# Patient Record
Sex: MALE | Race: WHITE | Employment: FULL TIME | URBAN - METROPOLITAN AREA
[De-identification: names, ages, dates, MRNs, and addresses within clinical notes are randomized per-mention and may not be internally consistent; named-entity substitution may affect disease eponyms.]

---

## 2018-01-26 ENCOUNTER — OFFICE VISIT (OUTPATIENT)
Dept: ORTHOPEDIC SURGERY | Age: 63
End: 2018-01-26

## 2018-01-26 NOTE — PROGRESS NOTES
[facility]  [address], [address]  Phone: [phone]  Fax: [phone]        INITIAL CONSULTATION      HISTORY OF PRESENT ILLNESS:  Kim Barfield is a 58 y.o. male whom is referred from *** secondary to ***. He rates pain ***/10. The patient is ***.  reviewed. There is no height or weight on file to calculate BMI. No past medical history on file. No past surgical history on file. Social History   Substance Use Topics    Smoking status: Not on file    Smokeless tobacco: Not on file    Alcohol use Not on file     Work status: unknown  Marital status: unknown        Allergies not on file       No family history on file. REVIEW OF SYSTEMS  Constitutional symptoms: Negative  Eyes: Negative  Ears, Nose, Throat, and Mouth: Negative  Cardiovascular: Negative  Respiratory: Negative  Genitourinary: Negative  Integumentary (Skin and/or breast): Negative  Musculoskeletal: Positive for ***  Extremities: Negative for edema. Endocrine/Rheumatologic: Negative  Hematologic/Lymphatic: Negative  Allergic/Immunologic: Negative  Psychiatric: Negative       PHYSICAL EXAMINATION  There were no vitals taken for this visit. CONSTITUTIONAL: NAD, A&O x 3  HEART: Regular rate and rhythm  ABDOMEN: Positive bowel sounds, soft, nontender, and nondistended  LUNGS: Clear to auscultation bilaterally. SKIN: No rashes noted. RANGE OF MOTION: The patient has full passive range of motion in all four extremities. SENSATION: Sensation is intact to light touch throughout. MOTOR:   Straight Leg Raise: {Pos/neg/not test:765751::\"Negative\"}, {right/left:84764}  Veloz: {Pos/neg/not test:887420::\"Negative\"}, {right/left:91518}  Tandem Gait: {Blank Single Select Template:20061::\"Neg. \",\"Mild LOB\",\"Severe LOB\"}   Deep tendon reflexes are 2+ at the brachioradialis, biceps, and triceps. Deep tendon reflexes are 2+ at the knees and ankles bilaterally.     *** Shoulder AB/Flex Elbow Flex Wrist Ext Elbow Ext Wrist Flex Hand Intrin Tone   Right +4/5 +4/5 +4/5 +4/5 +4/5 +4/5 +4/5   Left +4/5 +4/5 +4/5 +4/5 +4/5 +4/5 +4/5             *** Hip Flex Knee Ext Knee Flex Ankle DF GTE Ankle PF Tone   Right +4/5 +4/5 +4/5 +4/5 +4/5 +4/5 +4/5   Left +4/5 +4/5 +4/5 +4/5 +4/5 +4/5 +4/5         ASSESSMENT   {There are no diagnoses linked to this encounter. (Refresh or delete this SmartLink)}       IMPRESSIONS/RECOMMENDATIONS:  *** I will see the patient back in *** month's time or earlier if needed. Written by Sadi Bhat, as dictated by Vish Portillo MD  I examined the patient, reviewed and agree with the note.

## 2018-09-28 ENCOUNTER — IMPORTED ENCOUNTER (OUTPATIENT)
Dept: URBAN - METROPOLITAN AREA CLINIC 1 | Facility: CLINIC | Age: 63
End: 2018-09-28

## 2018-09-28 PROBLEM — H04.123: Noted: 2018-09-28

## 2018-09-28 PROBLEM — H25.813: Noted: 2018-09-28

## 2018-09-28 PROBLEM — H16.143: Noted: 2018-09-28

## 2018-09-28 PROBLEM — H43.391: Noted: 2018-09-28

## 2018-09-28 PROCEDURE — 92014 COMPRE OPH EXAM EST PT 1/>: CPT

## 2018-09-28 NOTE — PATIENT DISCUSSION
1.  Vitreous Floaters OD -- No Pigmented Cells seen or found on exam today. No Retinal Tear / Detachment seen or found on exam today. RD Precautions given. 2.  Cataracts OU -- Observe for now without intervention. The patient was advised to contact us if any change or worsening of vision. 3. SUSHIL w/ PEK OU -- Recommend the frequent use of OTC AT's BID-QID OU (sample given). Return for an appointment in 1 YR for a 30 OU with Dr. Sharee Casarez. Patient defers the refraction at today's visit (will return under [address] / Insurance).

## 2018-11-02 ENCOUNTER — HOSPITAL ENCOUNTER (OUTPATIENT)
Dept: GENERAL RADIOLOGY | Age: 63
Discharge: HOME | End: 2018-11-02
Attending: RADIOLOGY | Admitting: RADIOLOGY
Payer: COMMERCIAL

## 2018-11-02 ENCOUNTER — APPOINTMENT (OUTPATIENT)
Dept: CT IMAGING | Age: 63
End: 2018-11-02
Attending: NEUROLOGICAL SURGERY
Payer: COMMERCIAL

## 2018-11-02 VITALS
RESPIRATION RATE: 16 BRPM | DIASTOLIC BLOOD PRESSURE: 86 MMHG | BODY MASS INDEX: 24.42 KG/M2 | SYSTOLIC BLOOD PRESSURE: 126 MMHG | WEIGHT: 174.44 LBS | HEART RATE: 62 BPM | HEIGHT: 71 IN | OXYGEN SATURATION: 98 % | TEMPERATURE: 96.7 F

## 2018-11-02 DIAGNOSIS — M48.061 LUMBAR STENOSIS: ICD-10-CM

## 2018-11-02 DIAGNOSIS — M54.50 LOW BACK PAIN: ICD-10-CM

## 2018-11-02 DIAGNOSIS — M54.16 LUMBAR RADICULOPATHY: ICD-10-CM

## 2018-11-02 PROCEDURE — 74011250636 HC RX REV CODE- 250/636: Performed by: RADIOLOGY

## 2018-11-02 PROCEDURE — 74011250637 HC RX REV CODE- 250/637: Performed by: RADIOLOGY

## 2018-11-02 PROCEDURE — 74011636320 HC RX REV CODE- 636/320: Performed by: RADIOLOGY

## 2018-11-02 PROCEDURE — 72132 CT LUMBAR SPINE W/DYE: CPT

## 2018-11-02 PROCEDURE — 62304 MYELOGRAPHY LUMBAR INJECTION: CPT

## 2018-11-02 RX ORDER — LIDOCAINE HYDROCHLORIDE 10 MG/ML
10 INJECTION, SOLUTION EPIDURAL; INFILTRATION; INTRACAUDAL; PERINEURAL
Status: COMPLETED | OUTPATIENT
Start: 2018-11-02 | End: 2018-11-02

## 2018-11-02 RX ORDER — HYDROCODONE BITARTRATE AND ACETAMINOPHEN 7.5; 325 MG/1; MG/1
1 TABLET ORAL
Status: DISCONTINUED | OUTPATIENT
Start: 2018-11-02 | End: 2018-11-02 | Stop reason: HOSPADM

## 2018-11-02 RX ADMIN — HYDROCODONE BITARTRATE AND ACETAMINOPHEN 1 TABLET: 7.5; 325 TABLET ORAL at 12:45

## 2018-11-02 RX ADMIN — LIDOCAINE HYDROCHLORIDE 5 ML: 10 INJECTION, SOLUTION EPIDURAL; INFILTRATION; INTRACAUDAL; PERINEURAL at 10:45

## 2018-11-02 RX ADMIN — IOPAMIDOL 20 ML: 408 INJECTION, SOLUTION INTRATHECAL at 10:48

## 2018-11-02 NOTE — DISCHARGE INSTRUCTIONS
Myelogram: About This Test  What is it? A myelogram uses X-rays and a special dye to make pictures of bones and nerves of the spine (spinal canal). The spinal canal holds the spinal cord, the spinal nerve roots, and the fluid-filled space between the bones in your spine. Why is this test done? A myelogram is done to check for:  · The cause of arm or leg numbness, weakness, or pain. · Narrowing of the spinal canal (spinal stenosis). · A tumor or infection causing problems with the spinal cord or nerve roots. · A spinal disc that has ruptured (herniated disc). · Inflammation of the membrane that covers the brain and spinal cord. · Problems with the blood vessels to the spine. How can you prepare for the test?  Your doctor will tell you if you need to change how much you eat and drink before the myelogram. You may be asked to increase the amount of water you drink before the test. Follow the instructions your doctor gives you about eating and drinking. Before a myelogram, tell your doctor if:  · You are allergic to any medicines, contrast material, or iodine dye. · You have had bleeding problems, or you take a blood thinner, such as aspirin, clopidogrel (Plavix), or warfarin (Coumadin), or you take over-the-counter medicines, such as ibuprofen (Advil, Motrin) or naproxen (Aleve). Your doctor will tell you when you should stop taking these medicines several days before your procedure. Make sure that you understand exactly what he or she wants you to do. · You have had kidney problems. · You have diabetes, especially if you take metformin (Glucophage, for example). · You are or might be pregnant. Ask someone to take you home and stay with you after the test.  What happens during the test?  The dye is put into your spinal canal with a thin needle. This is called a lumbar puncture. The dye moves through the space so the nerve roots and spinal cord can be seen more clearly.  After the dye is put in, you will lie still while the X-ray pictures are taken. How does it feel? You will feel a quick sting from a small needle that has medicine to numb the skin on your back. You will also feel some pressure as the long, thin spinal needle is put into your spinal canal. You may feel a quick sharp pain down your buttock or leg when the needle is moved in your spine. The dye may make you feel warm and flushed and have a metallic taste in your mouth. What else should you know about the test?  In rare cases, the hole made by the needle in the sac around the spine does not close normally. This can allow spinal fluid to leak out. This leak may need to be repaired through a procedure called an epidural blood patch. To do the patch, your doctor injects some of your own blood to cover the hole. How long does the test take? · A myelogram usually takes 30 minutes to 1 hour. What happens after the test?  · You will probably be able to go home 30 minutes to 2 hours after the test.  · You may need to lie in bed with your head raised for 4 to 24 hours after the test. To prevent seizures, which are a rare side effect, do not bend over or lie down with your head lower than your body. Keeping your head higher than your body after a myelogram also may help prevent or reduce other side effects, such as headache, nausea, or vomiting. · Avoid strenuous activity, such as running or heavy lifting, for at least 1 day after your myelogram.  · Drink plenty of water after the myelogram. Your doctor will give you instructions on taking your regular medicines. When should you call for help? Call 911 anytime you think you may need emergency care. For example, call if:  · You have a seizure. Call your doctor now or seek immediate medical care if:  · You have any increase in pain, weakness, or numbness in your legs. · You have a severe headache or stiff neck, or your eyes become very sensitive to light.   · You have a headache that lasts longer than 24 hours. · You have problems urinating or having a bowel movement. · You have a fever. Follow-up care is a key part of your treatment and safety. Be sure to make and go to all appointments, and call your doctor if you are having problems. It's also a good idea to keep a list of the medicines you take. Ask your doctor when you can expect to have your test results. Where can you learn more? Go to http://flaca-kimmie.info/. Enter A140 in the search box to learn more about \"Myelogram: About This Test.\"  Current as of: June 26, 2018  Content Version: 11.8  © 1433-5092 [employer]. Care instructions adapted under license by [employer] (which disclaims liability or warranty for this information). If you have questions about a medical condition or this instruction, always ask your healthcare professional. [place] [address] any warranty or liability for your use of this information. DISCHARGE SUMMARY from Nurse    PATIENT INSTRUCTIONS:    After general anesthesia or intravenous sedation, for 24 hours or while taking prescription Narcotics:  · Limit your activities  · Do not drive and operate hazardous machinery  · Do not make important personal or business decisions  · Do  not drink alcoholic beverages  · If you have not urinated within 8 hours after discharge, please contact your surgeon on call.     Report the following to your surgeon:  · Excessive pain, swelling, redness or odor of or around the surgical area  · Temperature over 100.5  · Nausea and vomiting lasting longer than 4 hours or if unable to take medications  · Any signs of decreased circulation or nerve impairment to extremity: change in color, persistent  numbness, tingling, coldness or increase pain  · Any questions    What to do at Home:      These are general instructions for a healthy lifestyle:    No smoking/ No tobacco products/ Avoid exposure to second hand smoke  Surgeon General's Warning:  Quitting smoking now greatly reduces serious risk to your health. Obesity, smoking, and sedentary lifestyle greatly increases your risk for illness    A healthy diet, regular physical exercise & weight monitoring are important for maintaining a healthy lifestyle    You may be retaining fluid if you have a history of heart failure or if you experience any of the following symptoms:  Weight gain of 3 pounds or more overnight or 5 pounds in a week, increased swelling in our hands or feet or shortness of breath while lying flat in bed. Please call your doctor as soon as you notice any of these symptoms; do not wait until your next office visit. Recognize signs and symptoms of STROKE:    F-face looks uneven    A-arms unable to move or move unevenly    S-speech slurred or non-existent    T-time-call 911 as soon as signs and symptoms begin-DO NOT go       Back to bed or wait to see if you get better-TIME IS BRAIN. Warning Signs of HEART ATTACK     Call 911 if you have these symptoms:   Chest discomfort. Most heart attacks involve discomfort in the center of the chest that lasts more than a few minutes, or that goes away and comes back. It can feel like uncomfortable pressure, squeezing, fullness, or pain.  Discomfort in other areas of the upper body. Symptoms can include pain or discomfort in one or both arms, the back, neck, jaw, or stomach.  Shortness of breath with or without chest discomfort.  Other signs may include breaking out in a cold sweat, nausea, or lightheadedness. Don't wait more than five minutes to call 911 - MINUTES MATTER! Fast action can save your life. Calling 911 is almost always the fastest way to get lifesaving treatment. Emergency Medical Services staff can begin treatment when they arrive -- up to an hour sooner than if someone gets to the hospital by car. The discharge information has been reviewed with the patient.   The patient verbalized understanding. Discharge medications reviewed with the patient and appropriate educational materials and side effects teaching were provided. ___________________________________________________________________________________________________________________________________    Patient armband removed and given to patient to take home.   Patient was informed of the privacy risks if armband lost or stolen

## 2018-11-02 NOTE — PERIOP NOTES
Pt arrived. Orders released. VS Monitoring initiated. Oriented to POC and Room. Wife by bedside. VR/CT disc provided to wife. Pt is resting with his eyes closed.

## 2018-12-14 PROBLEM — Z98.890 S/P LUMBAR LAMINECTOMY: Status: ACTIVE | Noted: 2018-12-14

## 2018-12-14 PROBLEM — M48.061 LUMBAR STENOSIS: Status: ACTIVE | Noted: 2018-12-14

## 2018-12-16 PROBLEM — M48.061 LUMBAR STENOSIS: Status: RESOLVED | Noted: 2018-12-14 | Resolved: 2018-12-16

## 2019-11-15 ENCOUNTER — IMPORTED ENCOUNTER (OUTPATIENT)
Dept: URBAN - METROPOLITAN AREA CLINIC 1 | Facility: CLINIC | Age: 64
End: 2019-11-15

## 2019-11-15 PROBLEM — H16.143: Noted: 2019-11-15

## 2019-11-15 PROBLEM — H43.391: Noted: 2019-11-15

## 2019-11-15 PROBLEM — H25.813: Noted: 2019-11-15

## 2019-11-15 PROBLEM — H04.123: Noted: 2019-11-15

## 2019-11-15 PROCEDURE — 92014 COMPRE OPH EXAM EST PT 1/>: CPT

## 2019-11-15 PROCEDURE — 92015 DETERMINE REFRACTIVE STATE: CPT

## 2019-11-15 NOTE — PATIENT DISCUSSION
1.  Cataracts OU -- Observe for now without intervention. The patient was advised to contact us if any change or worsening of vision. 2. SUSHIL w/ PEK OU -- Recommend the frequent use of OTC AT's BID-QID OU Routinely. 3.  Vitreous Floaters OD -- RD Precautions given. Return for an appointment in 1 YR for a 30 OU with Dr. Emiliano Walsh.

## 2020-11-13 ENCOUNTER — IMPORTED ENCOUNTER (OUTPATIENT)
Dept: URBAN - METROPOLITAN AREA CLINIC 1 | Facility: CLINIC | Age: 65
End: 2020-11-13

## 2020-11-13 PROBLEM — H43.391: Noted: 2020-11-13

## 2020-11-13 PROBLEM — H04.123: Noted: 2020-11-13

## 2020-11-13 PROBLEM — H16.143: Noted: 2020-11-13

## 2020-11-13 PROBLEM — H25.813: Noted: 2020-11-13

## 2020-11-13 PROCEDURE — 92014 COMPRE OPH EXAM EST PT 1/>: CPT

## 2020-11-13 NOTE — PATIENT DISCUSSION
Vitreous Floaters OD -- Stable. Patient deferred MRx today. Return for an appointment in 1 year 27 with Dr. Silvia Reyes.

## 2020-11-13 NOTE — PATIENT DISCUSSION
1.  Cataract OU -- Observe for now without intervention. The patient was advised to contact us if any change or worsening of vision2. SUSHIL w/ PEK OU -- Cont the recommended use of ATs BID OU routinely. 3. Vitreous Floaters OD -- Stable. Patient deferred MRx today. Return for an appointment in 1 year 27 with Dr. Kristy Sierra.

## 2021-01-08 PROBLEM — R55 SYNCOPE: Status: ACTIVE | Noted: 2021-01-08

## 2021-01-08 PROBLEM — I44.2 COMPLETE HEART BLOCK (HCC): Status: ACTIVE | Noted: 2021-01-08

## 2021-01-08 PROBLEM — R55 SYNCOPE AND COLLAPSE: Status: ACTIVE | Noted: 2021-01-08

## 2021-01-08 PROBLEM — I44.2 INTERMITTENT COMPLETE HEART BLOCK (HCC): Status: ACTIVE | Noted: 2021-01-08

## 2021-01-09 PROBLEM — Z95.0 CARDIAC PACEMAKER IN SITU: Status: ACTIVE | Noted: 2021-01-09

## 2021-08-03 PROBLEM — R55 SYNCOPE: Status: RESOLVED | Noted: 2021-01-08 | Resolved: 2021-08-03

## 2021-11-12 ENCOUNTER — IMPORTED ENCOUNTER (OUTPATIENT)
Dept: URBAN - METROPOLITAN AREA CLINIC 1 | Facility: CLINIC | Age: 66
End: 2021-11-12

## 2021-11-12 PROBLEM — H25.813: Noted: 2021-11-12

## 2021-11-12 PROBLEM — H43.813: Noted: 2021-11-12

## 2021-11-12 PROBLEM — H04.123: Noted: 2021-11-12

## 2021-11-12 PROBLEM — H43.811: Noted: 2021-11-12

## 2021-11-12 PROBLEM — H16.143: Noted: 2021-11-12

## 2021-11-12 PROCEDURE — 99214 OFFICE O/P EST MOD 30 MIN: CPT

## 2021-11-12 PROCEDURE — 92015 DETERMINE REFRACTIVE STATE: CPT

## 2021-11-12 NOTE — PATIENT DISCUSSION
1.  Cataract OU - Observe for now without intervention. The patient was advised to contact us if any change or worsening of vision. 2. SUSHIL w/ PEK OU - Recommended use of ATs BID OU routinely. 3. PVD w/o Tear OU - Patient was cautioned to call our office immediately if they experience   a substantial change in their symptoms such as an increase in floaters persistent flashes loss of visual field (may appear as a shadow or a curtain) or decrease in visual acuity as these may indicate a retinal tear or detachment. MRX given. Return for an appointment in 1 year 27 with Dr. Jovany Gamble.

## 2022-03-19 PROBLEM — I44.2 COMPLETE HEART BLOCK (HCC): Status: ACTIVE | Noted: 2021-01-08

## 2022-03-19 PROBLEM — Z98.890 S/P LUMBAR LAMINECTOMY: Status: ACTIVE | Noted: 2018-12-14

## 2022-03-19 PROBLEM — I44.2 INTERMITTENT COMPLETE HEART BLOCK (HCC): Status: ACTIVE | Noted: 2021-01-08

## 2022-03-20 PROBLEM — Z95.0 CARDIAC PACEMAKER IN SITU: Status: ACTIVE | Noted: 2021-01-09

## 2022-03-20 PROBLEM — R55 SYNCOPE AND COLLAPSE: Status: ACTIVE | Noted: 2021-01-08

## 2022-04-02 ASSESSMENT — VISUAL ACUITY
OS_CC: J1+
OS_CC: J1+
OS_SC: 20/20
OD_SC: 20/20-2
OD_CC: J1+
OS_GLARE: 20/50
OD_SC: 20/20
OD_CC: J1+
OD_CC: J1+
OD_SC: 20/20
OD_CC: J1+
OS_SC: 20/20-1
OS_SC: 20/20
OS_SC: 20/20
OS_CC: J1+
OD_GLARE: 20/50
OS_CC: J1+
OD_SC: 20/20

## 2022-04-02 ASSESSMENT — TONOMETRY
OD_IOP_MMHG: 21
OD_IOP_MMHG: 20
OS_IOP_MMHG: 20
OS_IOP_MMHG: 18
OD_IOP_MMHG: 18
OS_IOP_MMHG: 20
OS_IOP_MMHG: 21
OD_IOP_MMHG: 19

## 2023-01-31 RX ORDER — DEXTROAMPHETAMINE SULFATE 15 MG/1
30 CAPSULE, EXTENDED RELEASE ORAL DAILY
COMMUNITY